# Patient Record
Sex: MALE | Race: WHITE | Employment: FULL TIME | URBAN - METROPOLITAN AREA
[De-identification: names, ages, dates, MRNs, and addresses within clinical notes are randomized per-mention and may not be internally consistent; named-entity substitution may affect disease eponyms.]

---

## 2021-12-08 ENCOUNTER — OFFICE VISIT (OUTPATIENT)
Dept: FAMILY MEDICINE CLINIC | Age: 36
End: 2021-12-08
Payer: COMMERCIAL

## 2021-12-08 VITALS
WEIGHT: 203 LBS | RESPIRATION RATE: 18 BRPM | DIASTOLIC BLOOD PRESSURE: 86 MMHG | OXYGEN SATURATION: 97 % | BODY MASS INDEX: 26.9 KG/M2 | HEIGHT: 73 IN | SYSTOLIC BLOOD PRESSURE: 118 MMHG | HEART RATE: 70 BPM

## 2021-12-08 DIAGNOSIS — Z00.00 ANNUAL VISIT FOR GENERAL ADULT MEDICAL EXAMINATION WITHOUT ABNORMAL FINDINGS: Primary | ICD-10-CM

## 2021-12-08 DIAGNOSIS — Z76.89 ENCOUNTER TO ESTABLISH CARE: ICD-10-CM

## 2021-12-08 DIAGNOSIS — Z13.1 SCREENING FOR DIABETES MELLITUS: ICD-10-CM

## 2021-12-08 DIAGNOSIS — Z13.220 SCREENING, LIPID: ICD-10-CM

## 2021-12-08 LAB
A/G RATIO: 2.7 (ref 1.1–2.2)
ALBUMIN SERPL-MCNC: 5.3 G/DL (ref 3.4–5)
ALP BLD-CCNC: 61 U/L (ref 40–129)
ALT SERPL-CCNC: 15 U/L (ref 10–40)
ANION GAP SERPL CALCULATED.3IONS-SCNC: 12 MMOL/L (ref 3–16)
AST SERPL-CCNC: 19 U/L (ref 15–37)
BILIRUB SERPL-MCNC: 0.6 MG/DL (ref 0–1)
BUN BLDV-MCNC: 9 MG/DL (ref 7–20)
CALCIUM SERPL-MCNC: 10.1 MG/DL (ref 8.3–10.6)
CHLORIDE BLD-SCNC: 101 MMOL/L (ref 99–110)
CHOLESTEROL, TOTAL: 141 MG/DL (ref 0–199)
CO2: 26 MMOL/L (ref 21–32)
CREAT SERPL-MCNC: 0.8 MG/DL (ref 0.9–1.3)
GFR AFRICAN AMERICAN: >60
GFR NON-AFRICAN AMERICAN: >60
GLUCOSE BLD-MCNC: 105 MG/DL (ref 70–99)
HDLC SERPL-MCNC: 40 MG/DL (ref 40–60)
LDL CHOLESTEROL CALCULATED: 81 MG/DL
POTASSIUM SERPL-SCNC: 4.2 MMOL/L (ref 3.5–5.1)
SODIUM BLD-SCNC: 139 MMOL/L (ref 136–145)
TOTAL PROTEIN: 7.3 G/DL (ref 6.4–8.2)
TRIGL SERPL-MCNC: 102 MG/DL (ref 0–150)
VLDLC SERPL CALC-MCNC: 20 MG/DL

## 2021-12-08 PROCEDURE — 36415 COLL VENOUS BLD VENIPUNCTURE: CPT | Performed by: NURSE PRACTITIONER

## 2021-12-08 PROCEDURE — 99385 PREV VISIT NEW AGE 18-39: CPT | Performed by: NURSE PRACTITIONER

## 2021-12-08 SDOH — ECONOMIC STABILITY: FOOD INSECURITY: WITHIN THE PAST 12 MONTHS, YOU WORRIED THAT YOUR FOOD WOULD RUN OUT BEFORE YOU GOT MONEY TO BUY MORE.: NEVER TRUE

## 2021-12-08 SDOH — ECONOMIC STABILITY: FOOD INSECURITY: WITHIN THE PAST 12 MONTHS, THE FOOD YOU BOUGHT JUST DIDN'T LAST AND YOU DIDN'T HAVE MONEY TO GET MORE.: NEVER TRUE

## 2021-12-08 SDOH — ECONOMIC STABILITY: TRANSPORTATION INSECURITY
IN THE PAST 12 MONTHS, HAS LACK OF TRANSPORTATION KEPT YOU FROM MEETINGS, WORK, OR FROM GETTING THINGS NEEDED FOR DAILY LIVING?: NO

## 2021-12-08 SDOH — ECONOMIC STABILITY: TRANSPORTATION INSECURITY
IN THE PAST 12 MONTHS, HAS THE LACK OF TRANSPORTATION KEPT YOU FROM MEDICAL APPOINTMENTS OR FROM GETTING MEDICATIONS?: NO

## 2021-12-08 ASSESSMENT — PATIENT HEALTH QUESTIONNAIRE - PHQ9
2. FEELING DOWN, DEPRESSED OR HOPELESS: 0
SUM OF ALL RESPONSES TO PHQ QUESTIONS 1-9: 0
SUM OF ALL RESPONSES TO PHQ9 QUESTIONS 1 & 2: 0
SUM OF ALL RESPONSES TO PHQ QUESTIONS 1-9: 0
SUM OF ALL RESPONSES TO PHQ QUESTIONS 1-9: 0
1. LITTLE INTEREST OR PLEASURE IN DOING THINGS: 0

## 2021-12-08 ASSESSMENT — ENCOUNTER SYMPTOMS
DIARRHEA: 0
VOMITING: 0
ABDOMINAL DISTENTION: 0
SHORTNESS OF BREATH: 0
SINUS PRESSURE: 0
EYE REDNESS: 0
SORE THROAT: 0
ABDOMINAL PAIN: 0
EYE PAIN: 0
NAUSEA: 0
SINUS PAIN: 0
WHEEZING: 0
COUGH: 0
EYE DISCHARGE: 0

## 2021-12-08 ASSESSMENT — SOCIAL DETERMINANTS OF HEALTH (SDOH): HOW HARD IS IT FOR YOU TO PAY FOR THE VERY BASICS LIKE FOOD, HOUSING, MEDICAL CARE, AND HEATING?: NOT HARD AT ALL

## 2021-12-08 NOTE — PROGRESS NOTES
Shirly Shone (:  1985) is a 31 y.o. male,Established patient, here for evaluation of the following chief complaint(s):  New Patient (NEW PATIENT EST CARE, PREVIOUS PCP DR. GARCIA, DECLINES FLU SHOT ) and Hypertension (HAS HAD HIGH BP READING RECENTLY )      ASSESSMENT/PLAN:  1. Annual visit for general adult medical examination without abnormal findings  -Normal exam today  -Fasting labs  -Declines flu vaccine. Believes he is up-to-date on hepatitis B vaccine.  -Follow-up in 1 year for physical or sooner as needed  2. Encounter to establish care  -The patient's medical, surgical, social, and family history were reviewed with the patient. He is not on any medications  -Educated on office policy and procedure  3. Screening, lipid  -     Lipid Panel; Future  4. Screening for diabetes mellitus  -     Comprehensive Metabolic Panel; Future      Return in about 1 year (around 2022) for Annual Physical.    SUBJECTIVE/OBJECTIVE:  JOVAN Mckeon presents today to establish care as well as annual physical.  He denies any complaints today. Does note that he checks his blood pressure at home occasionally and runs borderline. His wife is a nurse at [employer] and keeps an eye on it. He denies any headaches, dizziness, chest pain, or shortness of breath. He works as a  at [employer] and gets a once a year health evaluation. Has not had a PCP since . He is fasting for labs today. Significant history of high blood pressure in the family but otherwise unremarkable. He does not smoke. Occasionally drinks alcohol. No other drug use. He is originally from [place]. Moved to the area with his wife. Has 2 kids. Review of Systems   Constitutional: Negative for chills and fever. HENT: Negative for ear discharge, ear pain, hearing loss, sinus pressure, sinus pain and sore throat. Eyes: Negative for pain, discharge and redness. Respiratory: Negative for cough, shortness of breath and wheezing. Cardiovascular: Negative for chest pain and palpitations. Gastrointestinal: Negative for abdominal distention, abdominal pain, diarrhea, nausea and vomiting. Genitourinary: Negative for dysuria and hematuria. Musculoskeletal: Negative for myalgias. Skin: Negative for rash. Neurological: Negative for dizziness, weakness, light-headedness, numbness and headaches. Psychiatric/Behavioral: Negative for decreased concentration, dysphoric mood and sleep disturbance. The patient is not nervous/anxious. Physical Exam  Vitals reviewed. Constitutional:       General: He is not in acute distress. Appearance: Normal appearance. He is normal weight. HENT:      Head: Normocephalic and atraumatic. Right Ear: Tympanic membrane, ear canal and external ear normal.      Left Ear: Tympanic membrane, ear canal and external ear normal.      Nose: Nose normal.      Mouth/Throat:      Mouth: Mucous membranes are moist.      Pharynx: Oropharynx is clear. No posterior oropharyngeal erythema. Eyes:      General: No scleral icterus. Right eye: No discharge. Left eye: No discharge. Extraocular Movements: Extraocular movements intact. Pupils: Pupils are equal, round, and reactive to light. Neck:      Vascular: No carotid bruit. Cardiovascular:      Rate and Rhythm: Normal rate and regular rhythm. Pulses: Normal pulses. Heart sounds: Normal heart sounds. No murmur heard. No gallop. Pulmonary:      Effort: Pulmonary effort is normal.      Breath sounds: Normal breath sounds. No wheezing. Abdominal:      General: Bowel sounds are normal.      Palpations: Abdomen is soft. Tenderness: There is no abdominal tenderness. There is no guarding or rebound. Musculoskeletal:         General: Normal range of motion. Cervical back: Normal range of motion and neck supple. Lymphadenopathy:      Cervical: No cervical adenopathy.    Skin:     General: Skin is warm and dry.      Capillary Refill: Capillary refill takes less than 2 seconds. Neurological:      Mental Status: He is alert and oriented to person, place, and time. Mental status is at baseline. Psychiatric:         Mood and Affect: Mood normal.         Behavior: Behavior normal.         Thought Content: Thought content normal.         Judgment: Judgment normal.               This dictation was generated by voice recognition computer software. Although all attempts are made to edit the dictation for accuracy, there may be errors in the transcription that are not intended. An electronic signature was used to authenticate this note.     --RICHARD Hoskins - CNP

## 2021-12-08 NOTE — PATIENT INSTRUCTIONS
Patient Education        Well Visit, Ages 25 to 48: Care Instructions  Overview     Well visits can help you stay healthy. Your doctor has checked your overall health and may have suggested ways to take good care of yourself. Your doctor also may have recommended tests. At home, you can help prevent illness with healthy eating, regular exercise, and other steps. Follow-up care is a key part of your treatment and safety. Be sure to make and go to all appointments, and call your doctor if you are having problems. It's also a good idea to know your test results and keep a list of the medicines you take. How can you care for yourself at home? · Get screening tests that you and your doctor decide on. Screening helps find diseases before any symptoms appear. · Eat healthy foods. Choose fruits, vegetables, whole grains, protein, and low-fat dairy foods. Limit fat, especially saturated fat. Reduce salt in your diet. · Limit alcohol. If you are a man, have no more than 2 drinks a day or 14 drinks a week. If you are a woman, have no more than 1 drink a day or 7 drinks a week. · Get at least 30 minutes of physical activity on most days of the week. Walking is a good choice. You also may want to do other activities, such as running, swimming, cycling, or playing tennis or team sports. Discuss any changes in your exercise program with your doctor. · Reach and stay at a healthy weight. This will lower your risk for many problems, such as obesity, diabetes, heart disease, and high blood pressure. · Do not smoke or allow others to smoke around you. If you need help quitting, talk to your doctor about stop-smoking programs and medicines. These can increase your chances of quitting for good. · Care for your mental health. It is easy to get weighed down by worry and stress. Learn strategies to manage stress, like deep breathing and mindfulness, and stay connected with your family and community.  If you find you often feel sad or hopeless, talk with your doctor. Treatment can help. · Talk to your doctor about whether you have any risk factors for sexually transmitted infections (STIs). You can help prevent STIs if you wait to have sex with a new partner (or partners) until you've each been tested for STIs. It also helps if you use condoms (male or female condoms) and if you limit your sex partners to one person who only has sex with you. Vaccines are available for some STIs, such as HPV. · Use birth control if it's important to you to prevent pregnancy. Talk with your doctor about the choices available and what might be best for you. · If you think you may have a problem with alcohol or drug use, talk to your doctor. This includes prescription medicines (such as amphetamines and opioids) and illegal drugs (such as cocaine and methamphetamine). Your doctor can help you figure out what type of treatment is best for you. · Protect your skin from too much sun. When you're outdoors from 10 a.m. to 4 p.m., stay in the shade or cover up with clothing and a hat with a wide brim. Wear sunglasses that block UV rays. Even when it's cloudy, put broad-spectrum sunscreen (SPF 30 or higher) on any exposed skin. · See a dentist one or two times a year for checkups and to have your teeth cleaned. · Wear a seat belt in the car. When should you call for help? Watch closely for changes in your health, and be sure to contact your doctor if you have any problems or symptoms that concern you. Where can you learn more? Go to https://rafa.[facility]Mail[facility]ners. org and sign in to your blinkbox account. Enter P072 in the Chromatin box to learn more about \"Well Visit, Ages 25 to 48: Care Instructions. \"     If you do not have an account, please click on the \"Sign Up Now\" link. Current as of: February 11, 2021               Content Version: 13.0  © 8381-1228 Healthwise, Incorporated.    Care instructions adapted under license by [facility]. If you have questions about a medical condition or this instruction, always ask your healthcare professional. [place] [address] any warranty or liability for your use of this information.

## 2022-08-08 ENCOUNTER — OFFICE VISIT (OUTPATIENT)
Dept: FAMILY MEDICINE CLINIC | Age: 37
End: 2022-08-08
Payer: COMMERCIAL

## 2022-08-08 VITALS
HEART RATE: 67 BPM | BODY MASS INDEX: 26.64 KG/M2 | SYSTOLIC BLOOD PRESSURE: 120 MMHG | HEIGHT: 73 IN | WEIGHT: 201 LBS | DIASTOLIC BLOOD PRESSURE: 78 MMHG | OXYGEN SATURATION: 98 %

## 2022-08-08 DIAGNOSIS — R07.9 CHEST PAIN, UNSPECIFIED TYPE: ICD-10-CM

## 2022-08-08 DIAGNOSIS — F41.9 ANXIETY: Primary | ICD-10-CM

## 2022-08-08 PROCEDURE — 93000 ELECTROCARDIOGRAM COMPLETE: CPT

## 2022-08-08 PROCEDURE — 99213 OFFICE O/P EST LOW 20 MIN: CPT

## 2022-08-08 RX ORDER — HYDROXYZINE HYDROCHLORIDE 25 MG/1
12.5-25 TABLET, FILM COATED ORAL EVERY 8 HOURS PRN
Qty: 30 TABLET | Refills: 0 | Status: SHIPPED | OUTPATIENT
Start: 2022-08-08 | End: 2022-09-07

## 2022-08-08 ASSESSMENT — ENCOUNTER SYMPTOMS
CHEST TIGHTNESS: 0
ABDOMINAL PAIN: 0
SORE THROAT: 0
BACK PAIN: 0
COUGH: 0
WHEEZING: 0
ABDOMINAL DISTENTION: 0
VOMITING: 0
DIARRHEA: 0
SINUS PAIN: 0
SHORTNESS OF BREATH: 0
SINUS PRESSURE: 0
NAUSEA: 0

## 2022-08-08 ASSESSMENT — PATIENT HEALTH QUESTIONNAIRE - PHQ9
SUM OF ALL RESPONSES TO PHQ9 QUESTIONS 1 & 2: 0
SUM OF ALL RESPONSES TO PHQ QUESTIONS 1-9: 0
1. LITTLE INTEREST OR PLEASURE IN DOING THINGS: 0
SUM OF ALL RESPONSES TO PHQ QUESTIONS 1-9: 0
2. FEELING DOWN, DEPRESSED OR HOPELESS: 0
SUM OF ALL RESPONSES TO PHQ QUESTIONS 1-9: 0
SUM OF ALL RESPONSES TO PHQ QUESTIONS 1-9: 0

## 2022-08-08 NOTE — PROGRESS NOTES
Natty Brady (:  1985) is a 30 y.o. male,Established patient, here for evaluation of the following chief complaint(s):  Chest Pain (Chest pain x 1 month /)         ASSESSMENT/PLAN:  1. Anxiety  -Given the chest pain worsens at rest when he is not preoccupied with anything else, the chest pain does not worsen with activity, is not reproducible with movements, and is not associated with meals, I am suspicious of anxiety. We discussed medication options. We agreed on hydroxyzine. Medication usage and side effects were discussed with patient. Medication was sent to the pharmacy. Follow-up in 1 month to discuss effectiveness of anxiety management. -     hydrOXYzine HCl (ATARAX) 25 MG tablet; Take 0.5-1 tablets by mouth every 8 hours as needed for Itching, Disp-30 tablet, R-0Normal  2. Chest pain, unspecified type  -Not suspicious for pain of cardiac nature. Educated if pain begins to worsen with activity instead of improve, let us know and we can consider stress test.  -     EKG 12 lead; Future      Return in about 4 weeks (around 2022) for Anxiety Follow-up. Subjective   SUBJECTIVE/OBJECTIVE:  JOVAN Pereira presents today for intermittent chest pain. This has been going on for a few months. When he first noticed it, it was a week before his vacation. He did not experience at all during his vacation. He experiences this left chest pain towards the left lateral breast/ribs more than half the days out of a month. He describes it as a 4 out of 10, and as pressure. This chest pain happens more with the rest, and improves when he exercises. It does not particularly happen after meals or coffee. He states he more so happens when he is not focused on anything else. When he gets this chest pain, he states he feels anxious about the chest pain. He monitors his blood pressure at home, and usually gets [id number] systolic over about 80. He states work has been stressful.   He is a manager at [employer]. It is been very busy. He has tried taking ibuprofen and Tylenol, which helps maybe a little bit. The chest pain is more consistent again since Friday. He denies any worsening chest pain with activity, any reproducible chest pain with lifting anything or exercise, or positional chest pain i.e. laying down. Review of Systems   Constitutional:  Negative for activity change, chills, diaphoresis, fatigue and fever. HENT:  Negative for ear discharge, ear pain, hearing loss, sinus pressure, sinus pain and sore throat. Respiratory:  Negative for cough, chest tightness, shortness of breath and wheezing. Cardiovascular:  Negative for chest pain, palpitations and leg swelling. Gastrointestinal:  Negative for abdominal distention, abdominal pain, diarrhea, nausea and vomiting. Musculoskeletal:  Negative for arthralgias, back pain, myalgias and neck pain. Skin:  Negative for rash. Neurological:  Negative for weakness, numbness and headaches. Psychiatric/Behavioral:  Negative for dysphoric mood. The patient is nervous/anxious. Objective   Physical Exam  Constitutional:       General: He is not in acute distress. Appearance: Normal appearance. He is normal weight. He is not ill-appearing or diaphoretic. HENT:      Head: Normocephalic and atraumatic. Nose: Nose normal. No congestion or rhinorrhea. Neck:      Vascular: No carotid bruit. Cardiovascular:      Rate and Rhythm: Normal rate and regular rhythm. Pulses: Normal pulses. Heart sounds: Normal heart sounds. No murmur heard. Pulmonary:      Effort: Pulmonary effort is normal. No respiratory distress. Breath sounds: Normal breath sounds. No wheezing. Chest:      Chest wall: No tenderness. Abdominal:      General: Abdomen is flat. Bowel sounds are normal. There is no distension. Palpations: Abdomen is soft. There is no mass. Tenderness: There is no abdominal tenderness.  There is no guarding or rebound. Hernia: No hernia is present. Musculoskeletal:         General: No tenderness or signs of injury. Normal range of motion. Cervical back: Normal range of motion. No tenderness. Skin:     General: Skin is warm and dry. Capillary Refill: Capillary refill takes less than 2 seconds. Findings: No rash. Neurological:      Mental Status: He is alert and oriented to person, place, and time. Psychiatric:         Mood and Affect: Mood normal.         Behavior: Behavior normal.         Thought Content: Thought content normal.         Judgment: Judgment normal.        Please note that this chart was generated using dragon dictation software. Although every effort was made to ensure the accuracy of this automated transcription, some errors in transcription may have occurred. An electronic signature was used to authenticate this note.     --RICHARD Polanco - CNP

## 2024-01-17 ENCOUNTER — OFFICE VISIT (OUTPATIENT)
Dept: FAMILY MEDICINE CLINIC | Age: 39
End: 2024-01-17
Payer: COMMERCIAL

## 2024-01-17 VITALS
WEIGHT: 205 LBS | HEIGHT: 73 IN | OXYGEN SATURATION: 98 % | HEART RATE: 75 BPM | DIASTOLIC BLOOD PRESSURE: 70 MMHG | SYSTOLIC BLOOD PRESSURE: 110 MMHG | BODY MASS INDEX: 27.17 KG/M2

## 2024-01-17 DIAGNOSIS — R73.01 IFG (IMPAIRED FASTING GLUCOSE): ICD-10-CM

## 2024-01-17 DIAGNOSIS — Z13.220 SCREENING, LIPID: ICD-10-CM

## 2024-01-17 DIAGNOSIS — R07.9 CHEST PAIN, UNSPECIFIED TYPE: ICD-10-CM

## 2024-01-17 DIAGNOSIS — Z00.00 ENCOUNTER FOR WELL ADULT EXAM WITHOUT ABNORMAL FINDINGS: Primary | ICD-10-CM

## 2024-01-17 PROCEDURE — 99395 PREV VISIT EST AGE 18-39: CPT | Performed by: NURSE PRACTITIONER

## 2024-01-17 SDOH — ECONOMIC STABILITY: FOOD INSECURITY: WITHIN THE PAST 12 MONTHS, YOU WORRIED THAT YOUR FOOD WOULD RUN OUT BEFORE YOU GOT MONEY TO BUY MORE.: NEVER TRUE

## 2024-01-17 SDOH — ECONOMIC STABILITY: HOUSING INSECURITY
IN THE LAST 12 MONTHS, WAS THERE A TIME WHEN YOU DID NOT HAVE A STEADY PLACE TO SLEEP OR SLEPT IN A SHELTER (INCLUDING NOW)?: NO

## 2024-01-17 SDOH — ECONOMIC STABILITY: INCOME INSECURITY: HOW HARD IS IT FOR YOU TO PAY FOR THE VERY BASICS LIKE FOOD, HOUSING, MEDICAL CARE, AND HEATING?: NOT HARD AT ALL

## 2024-01-17 SDOH — ECONOMIC STABILITY: FOOD INSECURITY: WITHIN THE PAST 12 MONTHS, THE FOOD YOU BOUGHT JUST DIDN'T LAST AND YOU DIDN'T HAVE MONEY TO GET MORE.: NEVER TRUE

## 2024-01-17 ASSESSMENT — ENCOUNTER SYMPTOMS
EYE DISCHARGE: 0
WHEEZING: 0
SINUS PRESSURE: 0
SINUS PAIN: 0
DIARRHEA: 0
EYE REDNESS: 0
COUGH: 0
VOMITING: 0
SHORTNESS OF BREATH: 0
EYE PAIN: 0
NAUSEA: 0
ABDOMINAL PAIN: 0
SORE THROAT: 0
ABDOMINAL DISTENTION: 0

## 2024-01-17 ASSESSMENT — PATIENT HEALTH QUESTIONNAIRE - PHQ9
SUM OF ALL RESPONSES TO PHQ9 QUESTIONS 1 & 2: 0
SUM OF ALL RESPONSES TO PHQ QUESTIONS 1-9: 0
2. FEELING DOWN, DEPRESSED OR HOPELESS: 0
1. LITTLE INTEREST OR PLEASURE IN DOING THINGS: 0
SUM OF ALL RESPONSES TO PHQ QUESTIONS 1-9: 0

## 2024-01-17 NOTE — PROGRESS NOTES
Well Adult Note  Name: Rajendra Charles Today’s Date: 2024   MRN: [id number] Sex: Male   Age: 38 y.o. Ethnicity: Non- / Non    : 1985 Race: White (non-)      Rajendra Charles is here for well adult exam.  History:  Rajendra presents today for his annual physical.  He only has 2 small concerns today.  First, he states that he has been checking his blood pressure at home fairly regularly and getting high numbers.  Has always been normotensive in office.  Did not know if this should be a concern or not.  He does state that he still intermittently gets the chest pain that he had discussed at a previous appointment in .  Typically happens for a few days straight when it does occur.  Typically occurs once or twice in a month.  He received EKG at that time which was unremarkable.  Thought maybe to be anxiety.  He was prescribed hydroxyzine which he states he stopped taking because it was making him drowsy.  He states that the pain is mild in nature, approximately 2/10, and throbbing in nature.  He does try to stay active and gets exercise about 3 days a week.  Has since felt that this has improved symptoms a little bit.  Typically does not occur with activity.  Has not noted any correlation with anxiety or increased stress.  He has not checked his blood pressure when symptoms occur.    The patient has no significant changes to his medical history.  Not on any medications.  No new surgeries.  Occasional alcohol.  Does not smoke.  No significant family history changes.  Declines vaccinations.  Not fasting today but agreeable to scheduling a labs only appointment at a later date.      Review of Systems   Constitutional:  Negative for chills and fever.   HENT:  Negative for ear discharge, ear pain, hearing loss, sinus pressure, sinus pain and sore throat.    Eyes:  Negative for pain, discharge and redness.   Respiratory:  Negative for cough, shortness of breath and wheezing.    Cardiovascular:

## 2024-01-17 NOTE — PATIENT INSTRUCTIONS
You may receive a survey regarding the care you received during your visit.  Your input is valuable to us.  We encourage you to complete and return your survey.  We hope you will choose us in the future for your healthcare needs. GENERAL OFFICE POLICIES      Telephone Calls: Messages will be answered within 1-2 business days, unless the provider is out of the office.  If it is urgent a covering provider will answer. (this does not include Medication refills).    MyChart:  We recommend all patients sign up for [employer].  Through this portal you can see your lab results, request refills, schedule appointments, pay your bill and send messages to the office.   On[employer]ps[employer] messages will be answered within 1-2 business days unless the provider is out of the office.  For urgent matters, please call the office.  Appointments:  All appointments must be scheduled.  We ask all patients to schedule their next follow up appointment before they leave the office to make sure you will be able to be seen before you run out of medications.  24 hours notice is required to cancel or reschedule an appointment to avoid being marked as a no show.  You may be dismissed from the practice after 3 no shows.    LATE for Appointment: If you are 15 or more minutes late for your appointment, you may be asked to reschedule.  MA/LAB APPTS: Must be scheduled, cannot accept walk in lab visits.  We only draw labs for patients established in our office.  We only do injections for medications ordered by our office.  Acute Sick Visits:  Nothing other than acute complaint will be addressed at this visit.  TRADITIONAL MEDICARE  DOES NOT COVER PHYSICALS  MEDICARE WELLNESS VISITS: These are NOT physicals but the free annual visit offered by Medicare to discuss wellness issues. Medication refills, checkups, etc. will not be addressed during this visit.  Medication Refills: Refills are handled electronically so please contact your pharmacy for medication refills

## 2024-03-13 ENCOUNTER — TELEPHONE (OUTPATIENT)
Dept: FAMILY MEDICINE CLINIC | Age: 39
End: 2024-03-13

## 2024-03-13 RX ORDER — PROPRANOLOL HYDROCHLORIDE 20 MG/1
20 TABLET ORAL 3 TIMES DAILY
Qty: 90 TABLET | Refills: 0 | Status: SHIPPED | OUTPATIENT
Start: 2024-03-13

## 2024-03-13 RX ORDER — PROPRANOLOL HYDROCHLORIDE 20 MG/1
20 TABLET ORAL 3 TIMES DAILY
COMMUNITY
End: 2024-03-13 | Stop reason: SDUPTHER

## 2024-03-13 NOTE — TELEPHONE ENCOUNTER
I WAS ASKED TO CALL THE PT BECAUSE HE WAS SCHEDULED AT THE [facility] BUT THEY ASKED THAT HE COME HERE OR GO TO THE ER DUE TO CHEST PAIN.  WHEN I SPOKE TO THE PT, HE SAID HIS BLOOD PRESSURE YESTERDAY /100.  PT THINKS HIS CHEST PAIN MAY BE ANXIETY OR STRESSED RELATED. HE PREVIOUSLY DISCUSSED IT WITH KASSIDY SANABRIA CNP AND HE WAS GIVEN HYDROXYZINE BUT IT MAKES HIM TOO TIRED. HE DOES FEEL LIKE IT HELPED WHEN HE TOOK IT. KASSIDY DISCUSSED CHANGING THE MED WHEN HE SAW HIM 1/17/2024 BUT PT DECLINED THE NEED FOR MEDICATION AT THAT TIME. HE IS OPEN TO TRYING THE PROPRANOLOL THAT KASSIDY RECOMMENDED ON 1/17/2024. I SCHEDULED HIM AN APPT IN ONE WEEK TO DISCUSS IF THE MEDICATION WORKS.  I ADVISED PT TO CALL BACK IF THE SYMPTOMS GET WORSE AT ANY TIME OR GO TO THE [facility] [place]. PT AGREED WITH THE PLAN.  WE WILL NEED TO CALL HIM WHEN MEDICATION HAS BEEN SENT IN.  [facility]    ALLERG: NICOLE Drew presents today for his annual physical.  He only has 2 small concerns today.  First, he states that he has been checking his blood pressure at home fairly regularly and getting high numbers.  Has always been normotensive in office.  Did not know if this should be a concern or not.  He does state that he still intermittently gets the chest pain that he had discussed at a previous appointment in 2022.  Typically happens for a few days straight when it does occur.  Typically occurs once or twice in a month.  He received EKG at that time which was unremarkable.  Thought maybe to be anxiety.  He was prescribed hydroxyzine which he states he stopped taking because it was making him drowsy.  He states that the pain is mild in nature, approximately 2/10, and throbbing in nature.  He does try to stay active and gets exercise about 3 days a week.  Has since felt that this has improved symptoms a little bit.  Typically does not occur with activity.  Has not noted any correlation with anxiety or increased stress.

## 2024-03-13 NOTE — TELEPHONE ENCOUNTER
I DISCUSSED WITH KASSIDY SANABRIA CNP. HE WOULD LIKE HIM TO START PROPRANOLOL 20 MG SI PO TID AS NEEDED FOR ANXIETY #90 X0 REFILL. HE WOULD LIKE THE PT TO KEEP HIS APPT NEXT WEEK TO FOLLOW UP. HE ADVISED IF PAIN DOESN'T RESOLVE THAT HE CAN COME IN FOR EKG OR GO TO THE ER. I CALLED THE PT AND HE AGREED WITH THE PLAN.  WILLIAN PYLE LPN WILL SEND THE SCRIPT TO PATIENCE BANDA.  JONES

## 2024-03-20 ENCOUNTER — OFFICE VISIT (OUTPATIENT)
Dept: FAMILY MEDICINE CLINIC | Age: 39
End: 2024-03-20
Payer: COMMERCIAL

## 2024-03-20 VITALS
BODY MASS INDEX: 27.3 KG/M2 | HEART RATE: 77 BPM | SYSTOLIC BLOOD PRESSURE: 136 MMHG | DIASTOLIC BLOOD PRESSURE: 82 MMHG | OXYGEN SATURATION: 97 % | WEIGHT: 206 LBS | HEIGHT: 73 IN

## 2024-03-20 DIAGNOSIS — R07.9 CHEST PAIN, UNSPECIFIED TYPE: Primary | ICD-10-CM

## 2024-03-20 PROCEDURE — 93000 ELECTROCARDIOGRAM COMPLETE: CPT | Performed by: NURSE PRACTITIONER

## 2024-03-20 PROCEDURE — 99214 OFFICE O/P EST MOD 30 MIN: CPT | Performed by: NURSE PRACTITIONER

## 2024-03-20 RX ORDER — OMEPRAZOLE 20 MG/1
20 CAPSULE, DELAYED RELEASE ORAL
Qty: 90 CAPSULE | Refills: 1 | Status: SHIPPED | OUTPATIENT
Start: 2024-03-20

## 2024-03-20 ASSESSMENT — ENCOUNTER SYMPTOMS
EYE DISCHARGE: 0
VOICE CHANGE: 0
TROUBLE SWALLOWING: 0
EYE PAIN: 0
SINUS PAIN: 0
ABDOMINAL PAIN: 0
SORE THROAT: 0
COUGH: 0
DIARRHEA: 0
SHORTNESS OF BREATH: 0
SINUS PRESSURE: 0
WHEEZING: 0
ABDOMINAL DISTENTION: 0
EYE REDNESS: 0
VOMITING: 0

## 2024-03-20 NOTE — PATIENT INSTRUCTIONS
You may receive a survey regarding the care you received during your visit.  Your input is valuable to us.  We encourage you to complete and return your survey.  We hope you will choose us in the future for your healthcare needs. GENERAL OFFICE POLICIES      Telephone Calls: Messages will be answered within 1-2 business days, unless the provider is out of the office.  If it is urgent a covering provider will answer. (this does not include Medication refills).    MyChart:  We recommend all patients sign up for [employer].  Through this portal you can see your lab results, request refills, schedule appointments, pay your bill and send messages to the office.   [employer] messages will be answered within 1-2 business days unless the provider is out of the office.  For urgent matters, please call the office.  Appointments:  All appointments must be scheduled.  We ask all patients to schedule their next follow up appointment before they leave the office to make sure you will be able to be seen before you run out of medications.  24 hours notice is required to cancel or reschedule an appointment to avoid being marked as a no show.  You may be dismissed from the practice after 3 no shows.    LATE for Appointment: If you are 15 or more minutes late for your appointment, you may be asked to reschedule.  MA/LAB APPTS: Must be scheduled, cannot accept walk in lab visits.  We only draw labs for patients established in our office.  We only do injections for medications ordered by our office.  Acute Sick Visits:  Nothing other than acute complaint will be addressed at this visit.  TRADITIONAL MEDICARE  DOES NOT COVER PHYSICALS  MEDICARE WELLNESS VISITS: These are NOT physicals but the free annual visit offered by Medicare to discuss wellness issues. Medication refills, checkups, etc. will not be addressed during this visit.  Medication Refills: Refills are handled electronically so please contact your pharmacy for medication refills

## 2024-03-20 NOTE — PROGRESS NOTES
Rajendra Charles (:  1985) is a 38 y.o. male,Established patient, here for evaluation of the following chief complaint(s):  Chest Pain (PT C/O CHEST PAIN, AND FEELS LIKE HIS NECK IS SWOLLEN AT TIMES OFF AND ON EVERY DAY )      ASSESSMENT/PLAN:  1. Chest pain, unspecified type  -EKG was unremarkable today.  GERD versus costochondritis versus musculoskeletal origin versus anxiety.  Discussed options.  Given that the patient also feels a sensation in his neck, could be indicative of uncontrolled GERD.  Will treat with omeprazole.  Educated on medication use as well as side effects.  If this does not seem to help, discontinue propranolol to see if this is due to the medication.  If this does not seem to help, try ibuprofen as needed for when the pain occurs.  This could indicate musculoskeletal origin versus costochondritis.  Could consider a stress test, but I do not believe this is needed at this time.  Follow-up as needed.  - EKG 12 Lead  - omeprazole (PRILOSEC) 20 MG delayed release capsule; Take 1 capsule by mouth every morning (before breakfast)  Dispense: 90 capsule; Refill: 1      Return if symptoms worsen or fail to improve.    SUBJECTIVE/OBJECTIVE:  HPI  Rajendra presents today regarding chest discomfort.  He states he has been having this off and on for quite some time.  He had brought this up at previous appointments.  He feels like it may be happening more often.  Also notes that he has moments where he feels like his neck is swollen.  He denies any increased anxiety.  Feels like overall if anything his anxiety has been better.  He is planning on leaving [employer] and going to a start up company.  Believes this will be less stress.  He has been taking propranolol.  Does not take 3 times a day but takes on occasion.  Has not noted a correlation with propranolol and the symptoms.  He denies any affiliation with food or activity.  Denies any improvement with positional change.  May get better if he lays down

## 2025-01-23 ENCOUNTER — OFFICE VISIT (OUTPATIENT)
Dept: FAMILY MEDICINE CLINIC | Age: 40
End: 2025-01-23
Payer: COMMERCIAL

## 2025-01-23 VITALS
SYSTOLIC BLOOD PRESSURE: 120 MMHG | BODY MASS INDEX: 27.57 KG/M2 | WEIGHT: 208 LBS | DIASTOLIC BLOOD PRESSURE: 76 MMHG | HEIGHT: 73 IN

## 2025-01-23 DIAGNOSIS — H66.91 ACUTE OTITIS MEDIA, RIGHT: Primary | ICD-10-CM

## 2025-01-23 PROCEDURE — 99214 OFFICE O/P EST MOD 30 MIN: CPT

## 2025-01-23 SDOH — ECONOMIC STABILITY: FOOD INSECURITY: WITHIN THE PAST 12 MONTHS, YOU WORRIED THAT YOUR FOOD WOULD RUN OUT BEFORE YOU GOT MONEY TO BUY MORE.: NEVER TRUE

## 2025-01-23 SDOH — ECONOMIC STABILITY: FOOD INSECURITY: WITHIN THE PAST 12 MONTHS, THE FOOD YOU BOUGHT JUST DIDN'T LAST AND YOU DIDN'T HAVE MONEY TO GET MORE.: NEVER TRUE

## 2025-01-23 ASSESSMENT — PATIENT HEALTH QUESTIONNAIRE - PHQ9
SUM OF ALL RESPONSES TO PHQ QUESTIONS 1-9: 0
SUM OF ALL RESPONSES TO PHQ9 QUESTIONS 1 & 2: 0
1. LITTLE INTEREST OR PLEASURE IN DOING THINGS: NOT AT ALL
SUM OF ALL RESPONSES TO PHQ QUESTIONS 1-9: 0
2. FEELING DOWN, DEPRESSED OR HOPELESS: NOT AT ALL
SUM OF ALL RESPONSES TO PHQ QUESTIONS 1-9: 0
SUM OF ALL RESPONSES TO PHQ QUESTIONS 1-9: 0

## 2025-01-23 NOTE — ASSESSMENT & PLAN NOTE
Acute condition, new, Finding consistent with acute right otitis media. Start 7-day course Augmentin 875-125 mg BID.

## 2025-01-23 NOTE — PROGRESS NOTES
Chief Complaint   Patient presents with    Ear Drainage     CLOGGED RIGHT EAR FOR ABOUT 24 HOURS. NOTHING IS COMING OUT, HAS HAD SOME HEAD CONGESTION FOR ABOUT  4 DAYS BIGGEST COMPLAINT IS EAR, TAKING MUCINEX D AND IBUPROFEN, NO FEVER          ASSESSMENT/PLAN    Problem List             Diagnosed       Nervous and Auditory    Acute otitis media, right - Primary       Acute condition, new, Finding consistent with acute right otitis media. Start 7-day course Augmentin 875-125 mg BID.         Relevant Medications    amoxicillin-clavulanate (AUGMENTIN) 875-125 MG per tablet         Patient was recommended to follow up with annual well health exam.    Return if symptoms worsen or fail to improve.    Subjective   Rajendra Charles is a 39 y.o. male being seen in clinic for right ear pain. Onset of symptom: 4 days ago. Symptoms include right ear discomfort with intermittent pain, nasal congestion. Location right ear. Tried OTC Mucinex and ibuprofen with no relief. Does have muffled hearing in his right ear as well.      Review of Systems  Per HPI    Social History     Tobacco Use    Smoking status: Never    Smokeless tobacco: Never   Vaping Use    Vaping status: Never Used   Substance Use Topics    Alcohol use: Yes     Alcohol/week: 2.5 standard drinks of alcohol     Types: 3 Standard drinks or equivalent per week    Drug use: No       Patient Active Problem List   Diagnosis    Acute otitis media, right        Objective   Vitals:  /76 (Site: Left Upper Arm, Position: Sitting, Cuff Size: Medium Adult)   Ht 1.854 m (6' 1\")   Wt 94.3 kg (208 lb)   BMI 27.44 kg/m²     Physical Exam:  Physical Exam  Vitals reviewed.   Constitutional:       General: He is not in acute distress.     Appearance: He is not ill-appearing.   HENT:      Head: Normocephalic and atraumatic.      Right Ear: Ear canal and external ear normal. Tympanic membrane is erythematous, retracted and bulging.      Left Ear: Tympanic membrane, ear canal and

## 2025-01-29 ENCOUNTER — TELEPHONE (OUTPATIENT)
Dept: FAMILY MEDICINE CLINIC | Age: 40
End: 2025-01-29

## 2025-01-29 DIAGNOSIS — H66.91 ACUTE OTITIS MEDIA, RIGHT: Primary | ICD-10-CM

## 2025-01-29 RX ORDER — DOXYCYCLINE HYCLATE 100 MG
100 TABLET ORAL 2 TIMES DAILY
Qty: 14 TABLET | Refills: 0 | Status: SHIPPED | OUTPATIENT
Start: 2025-01-29 | End: 2025-02-05

## 2025-01-29 RX ORDER — OFLOXACIN 3 MG/ML
5 SOLUTION AURICULAR (OTIC) 2 TIMES DAILY
Qty: 5 ML | Refills: 0 | Status: SHIPPED | OUTPATIENT
Start: 2025-01-29 | End: 2025-02-08

## 2025-01-29 NOTE — TELEPHONE ENCOUNTER
Pt has one more antibiotic for tonight.  He was in last week for his ears.  His ears are not feeling any better.  They are still feeling clogged.    They feel about the same.    Do you want to try another antibiotic?    Do you need to see him?    What is the next step?    Please call pt       He uses [facility]

## 2025-02-06 ENCOUNTER — TELEPHONE (OUTPATIENT)
Dept: FAMILY MEDICINE CLINIC | Age: 40
End: 2025-02-06

## 2025-02-06 DIAGNOSIS — H65.194 OTHER RECURRENT ACUTE NONSUPPURATIVE OTITIS MEDIA OF RIGHT EAR: Primary | ICD-10-CM

## 2025-02-06 NOTE — TELEPHONE ENCOUNTER
Patient was calling because he was in 2 weeks ago for an ear infection. His ears are still clogged and he is out of medication. He would like to know what his next steps are.     Can we please give him a call     [facility]     Phone number

## 2025-02-24 ENCOUNTER — OFFICE VISIT (OUTPATIENT)
Dept: ENT CLINIC | Age: 40
End: 2025-02-24
Payer: COMMERCIAL

## 2025-02-24 VITALS
HEART RATE: 62 BPM | DIASTOLIC BLOOD PRESSURE: 80 MMHG | HEIGHT: 73 IN | OXYGEN SATURATION: 98 % | BODY MASS INDEX: 26.37 KG/M2 | WEIGHT: 199 LBS | SYSTOLIC BLOOD PRESSURE: 122 MMHG

## 2025-02-24 DIAGNOSIS — H66.91 ACUTE OTITIS MEDIA, RIGHT: ICD-10-CM

## 2025-02-24 DIAGNOSIS — H69.91 DYSFUNCTION OF RIGHT EUSTACHIAN TUBE: Primary | ICD-10-CM

## 2025-02-24 PROCEDURE — 99203 OFFICE O/P NEW LOW 30 MIN: CPT | Performed by: NURSE PRACTITIONER

## 2025-02-24 NOTE — PROGRESS NOTES
[facility]  DIVISION OF OTOLARYNGOLOGY- HEAD & NECK SURGERY  NEW PATIENT HISTORY AND PHYSICAL NOTE      Patient Name: Rajendra Charles  Medical Record Number:  [id number]  Primary Care Physician:  Hernandez Zuñiga APRN - CNP    ChiefComplaint     Chief Complaint   Patient presents with    Ear Fullness     RIGHT EAR, Ear infection 5 weeks ago       History of Present Illness     Rajendra Charles is an 39 y.o. male presenting with right ear fullness, popping, and crackling.  He was diagnosed with acute otitis media by his primary care approximately 6 weeks ago.  He was on 2 rounds of antibiotics, finished the most recent one 2 weeks ago.  He was using Afrin at the start of the viral illness, but has not used that in many weeks.  He is not using any other nasal sprays.   Denies recent acute hearing changes. Denies tinnitus.  No otalgia or otorrhea.  He had history of ear infections as a kid, never tubes.  No history of otologic surgery.  No family history of early onset hearing loss.  No loud noise exposures. Denies vertigo or dizziness.     No environmental or seasonal allergies.  No nasal spray use.    Past Medical History     Past Medical History:   Diagnosis Date    Erectile dysfunction 4/20/2011       Past Surgical History     Past Surgical History:   Procedure Laterality Date    NOSE SURGERY      broken nose    WISDOM TOOTH EXTRACTION         Family History     Family History   Problem Relation Age of Onset    High Blood Pressure Paternal Grandmother     Other Paternal Grandmother         Sudden respiratory failure       Social History     Social History     Tobacco Use    Smoking status: Never    Smokeless tobacco: Never   Vaping Use    Vaping status: Never Used   Substance Use Topics    Alcohol use: Yes     Alcohol/week: 2.5 standard drinks of alcohol     Types: 3 Standard drinks or equivalent per week    Drug use: No        Allergies     No Known Allergies    Medications     No current outpatient medications on